# Patient Record
Sex: MALE | Race: WHITE | Employment: OTHER | URBAN - METROPOLITAN AREA
[De-identification: names, ages, dates, MRNs, and addresses within clinical notes are randomized per-mention and may not be internally consistent; named-entity substitution may affect disease eponyms.]

---

## 2022-01-05 DIAGNOSIS — G47.00 INSOMNIA, UNSPECIFIED TYPE: Primary | ICD-10-CM

## 2022-01-05 RX ORDER — CLONAZEPAM 1 MG/1
1 TABLET ORAL NIGHTLY PRN
COMMUNITY
End: 2022-01-05 | Stop reason: SDUPTHER

## 2022-01-05 RX ORDER — CLONAZEPAM 1 MG/1
1 TABLET ORAL NIGHTLY PRN
Qty: 60 TABLET | Refills: 2 | Status: SHIPPED | OUTPATIENT
Start: 2022-01-05 | End: 2022-10-17

## 2022-03-22 ENCOUNTER — OFFICE VISIT (OUTPATIENT)
Dept: PULMONOLOGY | Age: 77
End: 2022-03-22
Payer: MEDICARE

## 2022-03-22 VITALS
DIASTOLIC BLOOD PRESSURE: 71 MMHG | HEART RATE: 75 BPM | BODY MASS INDEX: 36.45 KG/M2 | HEIGHT: 78 IN | WEIGHT: 315 LBS | SYSTOLIC BLOOD PRESSURE: 141 MMHG | OXYGEN SATURATION: 97 % | RESPIRATION RATE: 16 BRPM | TEMPERATURE: 96.3 F

## 2022-03-22 DIAGNOSIS — R06.02 SOB (SHORTNESS OF BREATH): ICD-10-CM

## 2022-03-22 DIAGNOSIS — G47.00 INSOMNIA, UNSPECIFIED TYPE: ICD-10-CM

## 2022-03-22 DIAGNOSIS — J44.9 CHRONIC OBSTRUCTIVE PULMONARY DISEASE, UNSPECIFIED COPD TYPE (HCC): ICD-10-CM

## 2022-03-22 DIAGNOSIS — G47.61 PLMD (PERIODIC LIMB MOVEMENT DISORDER): ICD-10-CM

## 2022-03-22 DIAGNOSIS — G47.33 OSA (OBSTRUCTIVE SLEEP APNEA): Primary | ICD-10-CM

## 2022-03-22 DIAGNOSIS — G47.10 HYPERSOMNOLENCE: ICD-10-CM

## 2022-03-22 PROCEDURE — 99214 OFFICE O/P EST MOD 30 MIN: CPT | Performed by: INTERNAL MEDICINE

## 2022-03-22 RX ORDER — FUROSEMIDE 40 MG/1
TABLET ORAL
COMMUNITY
Start: 2021-10-21

## 2022-03-22 RX ORDER — POTASSIUM CHLORIDE 750 MG/1
10 CAPSULE, EXTENDED RELEASE ORAL 2 TIMES DAILY
COMMUNITY

## 2022-03-22 RX ORDER — APIXABAN 5 MG/1
5 TABLET, FILM COATED ORAL 2 TIMES DAILY
COMMUNITY
Start: 2022-02-13

## 2022-03-22 RX ORDER — EZETIMIBE 10 MG/1
10 TABLET ORAL DAILY
COMMUNITY
Start: 2021-03-31

## 2022-03-22 RX ORDER — EZETIMIBE 10 MG/1
TABLET ORAL
COMMUNITY
Start: 2022-01-16 | End: 2022-03-22

## 2022-03-22 RX ORDER — TIZANIDINE 4 MG/1
TABLET ORAL
COMMUNITY
Start: 2022-03-15

## 2022-03-22 ASSESSMENT — ENCOUNTER SYMPTOMS
RESPIRATORY NEGATIVE: 1
GASTROINTESTINAL NEGATIVE: 1
EYES NEGATIVE: 1
ALLERGIC/IMMUNOLOGIC NEGATIVE: 1

## 2022-03-22 NOTE — LETTER
3/22/22        Dandre Lara      I have seen this patient in the office today and wanted to communicate my findings and recommendations. Patient Instructions     ASSESSMENT/PLAN:   Diagnosis Orders   1. MARYAM (obstructive sleep apnea)     2. Chronic obstructive pulmonary disease, unspecified COPD type ([address]     3. Insomnia, unspecified type     4. Hypersomnolence     5. SOB (shortness of breath)     6. PLMD (periodic limb movement disorder)         cxr 1/2018  Mild bibasilar atelectasis without definite acute airspace consolidation  or pleural effusion. PFT done 9/2017  Spirometry shows moderate obstructive defect. There is significant response to bronchodilator demonstrated. The flow volume loop is compatible with obstructive lung defect. There is air trapping present. DLCO is moderately reduced but corrects partially when adjusted to [facility].    2/4/20  LDCT done at [address] [place] [address]:    1. There are several stable nodules identified within the left lung measuring 5 mm and less in size. 2. Coronary artery calcification. 3. Stable rounded atelectasis and scarring within the right lower lobe. ASSESSMENT: Lung RADS Category 2 - Benign appearance or behavior    MODIFIER: None    RECOMMENDATION: Follow Up CT Screening Lung (Low-Dose CT - LDCT) in 1 Year      Low-dose CT scan done [facility]/14/2021 at [facility]  IMPRESSION:   Stable scattered small pulmonary nodules measuring up to 5 mm.      ASSESSMENT: Lung RADS Category 2 - Benign appearance or behavior     MODIFIER: None     RECOMMENDATION: Follow Up CT Screening Lung (Low-Dose CT - LDCT) in 1 Year        SOB  Tried and failed with Dulera (caused tachycardia),   Tried incruse and didn't feel like it worked  sprivia worked well but not worth the cost  anoro worked the best but too expensive  incruse really didnh't help  stiolto and anoro tried and did not work    Continue with   Albuterol as needed    Will start on  sprivia respimat      Call me after using for 2 weeks      Weight was 350 then at 347 and then 338 and then at 335 lbs and then at 359 lbs and now at 347  Keep losing weight  Would consider consultation with Gastric/weight loss center    Blood pressure is controlled       autopap to min of 11 and max of 15  cflex of 3  Has 60131 + hrs  Doing well with this    Using 100% of time  Using 7.5 h/night  No leak  90% pressure is 12.6  No sleep apnea,ahi is 2.5  pb is 14%    Has older machine not capable of internet access  Seems not to be recording correctly and pressure may not be holding    The PAP therapy is not working properly. It is too old to repair or replace the parts. The patient will need new PAP therapy. The patient has been feeling benefit from PAP therapy and will need to continue. Cpap/Bipap is too old to fix and is not functioning properly  Patient has benefited from PAP use but now needs a new one  It is beyond repair and will need new replacement  Patient can not do without pap therapy       dme is [facility]    Using the cpap effectively  No issues  Sleeping well  No sleepiness in the daytime  ESS is 4/24    Cleared to drive as per [employer] rules, without restrictions    No insomnia  no cramps in the morning  Some plms    Will continue with :  Klonopin 1 mg at night, as needed  Will refill  Can use 1/2 tablet of the Klonopin    Can use :   Albuterol (proair-red inhaler) as needed 1-2 puffs, use sparingly at the most using this once a week    Exercise every other day    RTC in 6 months                     Thank you for allowing me to assist in the care of the Dwayne Elliott MD

## 2022-03-22 NOTE — PROGRESS NOTES
Joana Chopra (: 1945 ) is a 78 y.o. male here for an evaluation of   Chief Complaint   Patient presents with    Follow-up     sleep    Sleep Apnea         ASSESSMENT/PLAN:   Diagnosis Orders   1. MARYAM (obstructive sleep apnea)     2. Chronic obstructive pulmonary disease, unspecified COPD type ([address]     3. Insomnia, unspecified type     4. Hypersomnolence     5. SOB (shortness of breath)     6. PLMD (periodic limb movement disorder)         cxr 2018  Mild bibasilar atelectasis without definite acute airspace consolidation  or pleural effusion. PFT done 2017  Spirometry shows moderate obstructive defect. There is significant response to bronchodilator demonstrated. The flow volume loop is compatible with obstructive lung defect. There is air trapping present. DLCO is moderately reduced but corrects partially when adjusted to [place].    20  LDCT done at [address] [place] [address]:    1. There are several stable nodules identified within the left lung measuring 5 mm and less in size. 2. Coronary artery calcification. 3. Stable rounded atelectasis and scarring within the right lower lobe. ASSESSMENT: Lung RADS Category 2 - Benign appearance or behavior    MODIFIER: None    RECOMMENDATION: Follow Up CT Screening Lung (Low-Dose CT - LDCT) in 1 Year      Low-dose CT scan done 2021 at the [facility]  IMPRESSION:   Stable scattered small pulmonary nodules measuring up to 5 mm.      ASSESSMENT: Lung RADS Category 2 - Benign appearance or behavior     MODIFIER: None     RECOMMENDATION: Follow Up CT Screening Lung (Low-Dose CT - LDCT) in 1 Year        SOB  Tried and failed with Dulera (caused tachycardia),   Tried incruse and didn't feel like it worked  sprivia worked well but not worth the cost  anoro worked the best but too expensive  incruse really didnh't help  stiolto and anoro tried and did not work    Continue with   Albuterol as needed    Will start on  sprivia respimat      Call me after using for 2 weeks      Weight was 350 then at 347 and then 338 and then at 335 lbs and then at 359 lbs and now at 347  Keep losing weight  Would consider consultation with Gastric/weight loss center    Blood pressure is controlled       autopap to min of 11 and max of 15  cflex of 3  Has 24619 + hrs  Doing well with this    Using 100% of time  Using 7.5 h/night  No leak  90% pressure is 12.6  No sleep apnea,ahi is 2.5  pb is 14%    Has older machine not capable of internet access  Seems not to be recording correctly and pressure may not be holding    The PAP therapy is not working properly. It is too old to repair or replace the parts. The patient will need new PAP therapy. The patient has been feeling benefit from PAP therapy and will need to continue. Cpap/Bipap is too old to fix and is not functioning properly  Patient has benefited from PAP use but now needs a new one  It is beyond repair and will need new replacement  Patient can not do without pap therapy       dme is [facility]    Using the cpap effectively  No issues  Sleeping well  No sleepiness in the daytime  ESS is 4/24    Cleared to drive as per [employer] rules, without restrictions    No insomnia  no cramps in the morning  Some plms    Will continue with :  Klonopin 1 mg at night, as needed  Will refill  Can use 1/2 tablet of the Klonopin    Can use : Albuterol (proair-red inhaler) as needed 1-2 puffs, use sparingly at the most using this once a week    Exercise every other day    RTC in 6 months              No follow-ups on file.               [address] [facility]  [address] [facility] [address]  Dept: [phone]  Loc: [phone]     Diagnosis:  [x] MARYAM (ICD-10: G47.33)  o CSA (ICD-10: G47.31)  [] Complex Sleep Apnea (ICD-10: G47.37)  []  (ICD-10: G47.37)  [] Hypoxemia (ICD-10: R09.02)  [] COPD (J44.90)  [] Chronic Respiratory Failure with hypoxemia (J96.11)  [] Chronicr Respiratory Failure with hypercapnia (J96.12)  [] Restrictive Lung Disease (J98.4)      [x] New Rx (Device Preference: ______autoresmed___________________)     [] Change Order       [] Replace ___________  [] Clinical assessments and may include IN-Check device, spirometry and ETCO2 PRN    [] Discontinue Order -  [] CPAP    [] BPAP    [] PAP    [] Oxygen   /   AMA?  [] Yes   [] No              Therapy    AHI: ________ YINA: ________  LOW SpO2: ________%      DME:[facility]    DEVICE / SETTINGS RAMP / COMFORT INTERFACE   []  CPAP () Pressure    Ramp: _________ min's  [] Ramp to patient preference General PAP Supply Kit  Medicaid does not cover heated tubing  ([employer])  PAP Tubing:  [x] Heated ()- 1/3 mos                         [x] Regular ()  1/3 mos  [x] Disposable Water Chamber () - 1/6 mos  [x] Disposable Filter () - 2/mo  [x] Non-Disposable Filter () - 1/6 mos   []  BiLevel PAP ()           IPAP        []  BiLevel PAP with   ()       Backup Rate ()      EPAP Rate  [] Adjust FLEX to patient comfort       SUPPLEMENTAL OXYGEN  [] OXYGEN:      Liter/min: _________  [] Continuous        [] Nocturnal  [] Bleed into PAP Device      [x]  Altair Prep 11                  Max Press 15  Mask Interface Kit    [] Mask interface () - 1/3 mos  [] Nasal Cushion ()  2/mo  [] Nasal Pillows ()  -2/mo  [] Headgear ()  1/6 mos   []  AutoBiLevel () Pressure  ()      Support           []  ResMed® IVAPS EPAP  [] Overnight Oximetry on Room Air  [] Overnight Oximetry on PAP Therapy    Target Pt Rate  Min PS      Target Va  Patient Ht  Ti Max                Ti Min        Rise time  Max PS  Trigger  Cycle  Epap  Epap max  Epap min  The patient has a history of:  [] Excessive Daytime Sleepiness  [] Insomnia  [] Impaired Cognition  [] Ischemic Heart Disease  [] Hypertension  [] Mood Disorders          [] History of properly fit, is the most comfortable to the patient and will be used with the BPAP device. [] The prescribed mask interface has been properly fit, is the most comfortable to the patient and will be used with the RAD.   o Current CPAP setting prevents patient from tolerating the therapy and lower CPAP settings fail to adequately control the symptoms of MARYAM, improve sleep quality, or reduce AHI to acceptable levels. [] Current CPAP setting prevent patient from tolerating the therapy and lower PAP settings fail to  adequately control MARYAM symptoms, improve sleep quality, or reduce AHI to acceptable levels.          [] There is significant improvement of the respiratory events on the RAD                                                                                                                                                                                                                                  Chasidy Miller MD               NPI- [id number]     Heydi Angel [phone]                    03/22/22       ____________________________                        _______________________           Physician Signature                                                         Date                                                 SUBJECTIVE/OBJECTIVE:    Transfer of care from trial to [address] [place] [address]  Initially seen at [address] [facility] pulmonary and sleep on 3/22/2022    Last seen at [facility] on 2/11/2021    Consult from Dr. Velazquez Number  Initially done 3/16/18  For copd    Dx recently with copd by PFT , done in 9/2017    Sob for year +  Getting worse    Was tried on inhaler hwoever then had issues with heart,  Hr was 164    Sob will affect the walking     No noct symptoms    Shx:  Quit 3/2012, smoking  20 +pky  H/o etoh abuse but occasional etoh    Fhx:  Heart disease, father with 2 MI  GF had lung cancer    noheadache  No chest pain  No ear popping     No bloating  No burping     Last couple of weeks, will wake up at 230 and then wake up 430        Going ot bed at 10 pm and waking up at 5-30 am     Still using klonopin and using as needed  No SE       Tried incruse and spirivia  sprivia worked well    proair seems to help    However using this about every other day    Having issues with breathing issues    incruse not really working    Now on the klonopin at 0.5 mg from the 1 mg  And would like to coming    Legs not bothering him at night    proair maybe once a day    Breathing   Seeing Dr. Marlee Fenton  Cardiologist  And had testing done  EF is 64%    Right now off all medications for the lungs    Albuterol taking as needed, about 1-2 times a week    Padilla with carrying with sob    No chest pain    breathign  PADILLA with exertion and carrying  Albuterol as needed    Now on weight watchers    No headache  No bloating  No burping  No ear popping  No snoring    No dry mouth, occ    Going to bed at 10 pm and waking up 6am        Pt's machine was recalled but nothing yet, registered at [employer] but that was nine years. Having some dry mouth    Now with some sob, no chest pain  Using more albuterol              Review of Systems   Constitutional: Negative. HENT: Negative. Eyes: Negative. Respiratory: Negative. Cardiovascular: Negative. Gastrointestinal: Negative. Endocrine: Negative. Genitourinary: Negative. Musculoskeletal: Negative. Skin: Negative. Allergic/Immunologic: Negative. Neurological: Negative. Hematological: Negative. Psychiatric/Behavioral: Negative. Vitals:    03/22/22 0952   BP: (!) 141/71   Pulse: 75   Resp: 16   Temp: 96.3 °F (35.7 °C)   TempSrc: Temporal   SpO2: 97%   Weight: (!) 347 lb (157.4 kg)   Height: 6' 8\" (2.032 m)        Physical Exam  Vitals and nursing note reviewed. Constitutional:       General: He is not in acute distress. Appearance: Normal appearance. He is not ill-appearing. HENT:      Head: Normocephalic and atraumatic.       Right Ear: External ear normal.      Left Ear: External ear normal.      Nose: Nose normal.      Mouth/Throat:      Mouth: Mucous membranes are moist.      Pharynx: Oropharynx is clear. Comments: Mallampati 3  Eyes:      General: No scleral icterus. Extraocular Movements: Extraocular movements intact. Conjunctiva/sclera: Conjunctivae normal.      Pupils: Pupils are equal, round, and reactive to light. Cardiovascular:      Rate and Rhythm: Normal rate and regular rhythm. Pulses: Normal pulses. Heart sounds: Normal heart sounds. No murmur heard. No friction rub. Pulmonary:      Effort: No respiratory distress. Breath sounds: No stridor. No wheezing, rhonchi or rales. Chest:      Chest wall: No tenderness. Abdominal:      General: Abdomen is flat. Bowel sounds are normal. There is no distension. Tenderness: There is no abdominal tenderness. There is no guarding. Musculoskeletal:         General: No swelling or tenderness. Normal range of motion. Cervical back: Normal range of motion and neck supple. No rigidity. Skin:     General: Skin is warm and dry. Coloration: Skin is not jaundiced. Neurological:      General: No focal deficit present. Mental Status: He is alert and oriented to person, place, and time. Mental status is at baseline. Cranial Nerves: No cranial nerve deficit. Sensory: No sensory deficit. Motor: No weakness. Gait: Gait normal.   Psychiatric:         Mood and Affect: Mood normal.         Thought Content:  Thought content normal.         Judgment: Judgment normal.              Ngoc Holbrook MD

## 2022-03-22 NOTE — PROGRESS NOTES
MA Communication: The following orders are received by verbal communication from Seth Vyas MD    Orders include:     Follow up: 10/17/2022 9:00 am Otto VARELA order faxed

## 2022-03-22 NOTE — PATIENT INSTRUCTIONS
ASSESSMENT/PLAN:   Diagnosis Orders   1. MARYAM (obstructive sleep apnea)     2. Chronic obstructive pulmonary disease, unspecified COPD type ([facility]     3. Insomnia, unspecified type     4. Hypersomnolence     5. SOB (shortness of breath)     6. PLMD (periodic limb movement disorder)         cxr 1/2018  Mild bibasilar atelectasis without definite acute airspace consolidation  or pleural effusion. PFT done 9/2017  Spirometry shows moderate obstructive defect. There is significant response to bronchodilator demonstrated. The flow volume loop is compatible with obstructive lung defect. There is air trapping present. DLCO is moderately reduced but corrects partially when adjusted to [place].    2/4/20  LDCT done at [address] [place] [address]:    1. There are several stable nodules identified within the left lung measuring 5 mm and less in size. 2. Coronary artery calcification. 3. Stable rounded atelectasis and scarring within the right lower lobe. ASSESSMENT: Lung RADS Category 2 - Benign appearance or behavior    MODIFIER: None    RECOMMENDATION: Follow Up CT Screening Lung (Low-Dose CT - LDCT) in 1 Year      Low-dose CT scan done 11/14/2021 at the [facility]  IMPRESSION:   Stable scattered small pulmonary nodules measuring up to 5 mm.      ASSESSMENT: Lung RADS Category 2 - Benign appearance or behavior     MODIFIER: None     RECOMMENDATION: Follow Up CT Screening Lung (Low-Dose CT - LDCT) in 1 Year        SOB  Tried and failed with Dulera (caused tachycardia),   Tried incruse and didn't feel like it worked  sprivia worked well but not worth the cost  anoro worked the best but too expensive  incruse really didnh't help  stiolto and anoro tried and did not work    Continue with   Albuterol as needed    Will start on  sprivia respimat      Call me after using for 2 weeks      Weight was 350 then at 347 and then 338 and then at 335 lbs and then at 359 lbs and now at 347  Keep losing weight  Would consider consultation with Gastric/weight loss center    Blood pressure is controlled       autopap to min of 11 and max of 15  cflex of 3  Has 85361 + hrs  Doing well with this    Using 100% of time  Using 7.5 h/night  No leak  90% pressure is 12.6  No sleep apnea,ahi is 2.5  pb is 14%    Has older machine not capable of internet access  Seems not to be recording correctly and pressure may not be holding    The PAP therapy is not working properly. It is too old to repair or replace the parts. The patient will need new PAP therapy. The patient has been feeling benefit from PAP therapy and will need to continue. Cpap/Bipap is too old to fix and is not functioning properly  Patient has benefited from PAP use but now needs a new one  It is beyond repair and will need new replacement  Patient can not do without pap therapy       dme is [facility]    Using the cpap effectively  No issues  Sleeping well  No sleepiness in the daytime  ESS is 4/24    Cleared to drive as per [employer] rules, without restrictions    No insomnia  no cramps in the morning  Some plms    Will continue with :  Klonopin 1 mg at night, as needed  Will refill  Can use 1/2 tablet of the Klonopin    Can use :   Albuterol (proair-red inhaler) as needed 1-2 puffs, use sparingly at the most using this once a week    Exercise every other day    RTC in 6 months

## 2022-04-08 ENCOUNTER — TELEPHONE (OUTPATIENT)
Dept: PULMONOLOGY | Age: 77
End: 2022-04-08

## 2022-10-17 ENCOUNTER — OFFICE VISIT (OUTPATIENT)
Dept: PULMONOLOGY | Age: 77
End: 2022-10-17
Payer: MEDICARE

## 2022-10-17 VITALS
WEIGHT: 315 LBS | DIASTOLIC BLOOD PRESSURE: 72 MMHG | BODY MASS INDEX: 36.45 KG/M2 | RESPIRATION RATE: 16 BRPM | HEART RATE: 75 BPM | HEIGHT: 78 IN | SYSTOLIC BLOOD PRESSURE: 128 MMHG | OXYGEN SATURATION: 94 % | TEMPERATURE: 97.2 F

## 2022-10-17 DIAGNOSIS — Z87.891 PERSONAL HISTORY OF TOBACCO USE: ICD-10-CM

## 2022-10-17 DIAGNOSIS — G47.61 PLMD (PERIODIC LIMB MOVEMENT DISORDER): ICD-10-CM

## 2022-10-17 DIAGNOSIS — G47.10 HYPERSOMNOLENCE: ICD-10-CM

## 2022-10-17 DIAGNOSIS — G47.00 INSOMNIA, UNSPECIFIED TYPE: ICD-10-CM

## 2022-10-17 DIAGNOSIS — R06.02 SOB (SHORTNESS OF BREATH): ICD-10-CM

## 2022-10-17 DIAGNOSIS — G47.33 OSA (OBSTRUCTIVE SLEEP APNEA): ICD-10-CM

## 2022-10-17 DIAGNOSIS — J44.9 CHRONIC OBSTRUCTIVE PULMONARY DISEASE, UNSPECIFIED COPD TYPE (HCC): Primary | ICD-10-CM

## 2022-10-17 PROCEDURE — G0296 VISIT TO DETERM LDCT ELIG: HCPCS | Performed by: INTERNAL MEDICINE

## 2022-10-17 PROCEDURE — 1123F ACP DISCUSS/DSCN MKR DOCD: CPT | Performed by: INTERNAL MEDICINE

## 2022-10-17 PROCEDURE — 99214 OFFICE O/P EST MOD 30 MIN: CPT | Performed by: INTERNAL MEDICINE

## 2022-10-17 RX ORDER — ASCORBIC ACID 500 MG
500 TABLET ORAL DAILY
COMMUNITY

## 2022-10-17 RX ORDER — GABAPENTIN 300 MG/1
300 CAPSULE ORAL DAILY
COMMUNITY

## 2022-10-17 RX ORDER — ATORVASTATIN CALCIUM 80 MG/1
80 TABLET, FILM COATED ORAL DAILY
COMMUNITY

## 2022-10-17 RX ORDER — CILOSTAZOL 100 MG/1
100 TABLET ORAL 2 TIMES DAILY
COMMUNITY
Start: 2022-09-06

## 2022-10-17 ASSESSMENT — ENCOUNTER SYMPTOMS
GASTROINTESTINAL NEGATIVE: 1
EYES NEGATIVE: 1
ALLERGIC/IMMUNOLOGIC NEGATIVE: 1
RESPIRATORY NEGATIVE: 1

## 2022-10-17 NOTE — PROGRESS NOTES
MA Communication:   The following orders are received by verbal communication from Nicholas Raza MD    Orders include:  CT Lung Screen in 6 months (pt to schedule)       Order sent to [facility] via [facility]       6 mo fu scheduled 4/10/23

## 2022-10-17 NOTE — PROGRESS NOTES
Autumn Kwon (: 1945 ) is a 78 y.o. male here for an evaluation of   Chief Complaint   Patient presents with    Sleep Apnea         ASSESSMENT/PLAN:   Diagnosis Orders   1. Chronic obstructive pulmonary disease, unspecified COPD type ([address]        2. MARYAM (obstructive sleep apnea)        3. Insomnia, unspecified type        4. Hypersomnolence        5. PLMD (periodic limb movement disorder)        6. SOB (shortness of breath)        7. Personal history of tobacco use  AK VISIT TO DISCUSS LUNG CA SCREEN W LDCT    CT Lung Screen (Annual)          cxr 2018  Mild bibasilar atelectasis without definite acute airspace consolidation  or pleural effusion. PFT done 2017  Spirometry shows moderate obstructive defect. There is significant response to bronchodilator demonstrated. The flow volume loop is compatible with obstructive lung defect. There is air trapping present. DLCO is moderately reduced but corrects partially when adjusted to [place].    20  LDCT done at [address] [place] [address]:    1. There are several stable nodules identified within the left lung measuring 5 mm and less in size. 2. Coronary artery calcification. 3. Stable rounded atelectasis and scarring within the right lower lobe. ASSESSMENT: Lung RADS Category 2 - Benign appearance or behavior    MODIFIER: None    RECOMMENDATION: Follow Up CT Screening Lung (Low-Dose CT - LDCT) in 1 Year      Low-dose CT scan done 2021 at the [facility]  IMPRESSION:   Stable scattered small pulmonary nodules measuring up to 5 mm.      ASSESSMENT: Lung RADS Category 2 - Benign appearance or behavior     MODIFIER: None     RECOMMENDATION: Follow Up CT Screening Lung (Low-Dose CT - LDCT) in 1 Year      Tobacco use , history  Quit smoking  March 3, 2012  30 pky    Will get LDCT , call me results            SOB/COPD  Tried and failed with Dulera (caused tachycardia),   Tried incruse and didn't feel like it worked  sprivia worked well but not worth the cost  anoro worked the best but too expensive  incruse really didnh't help  stiolto and anoro tried and did not work    Continue with   Albuterol (proair-red inhaler) as needed 1-2 puffs, use sparingly at the most using this once a week          Weight was 350 then at 347 and then 338 and then at 335 lbs and then at 359 lbs and then at 347 and now at 358  Keep losing weight  Would consider consultation with Gastric/weight loss center    Blood pressure is controlled       autopap to min of 11 and max of 15  New  7/26/22    Epr is 3  Standard response    Using 100% of time  Using 8 h/night  No leak, 11.5  90% pressure is 13.9  No sleep apnea,ahi is 2.1    I have access via [employer]  Feeling the benefit of cpap therapy        dme is [facility]    Using the cpap effectively  No issues  Sleeping well  No sleepiness in the daytime  ESS is 4/24    Cleared to drive as per [place] rules, without restrictions      Insomnia/PLMS    Will continue with :  Klonopin 1 mg at night, as needed, I have filled this in the past  Now seeing Pain Physician  Right now on gabapentin   I can refill klonopin or Dr. Leslye Campos      Exercise every other day    RTC in 6 months              No follow-ups on file. I have personally reviewed and summarized the old records a        I have independently reviewed the images and reviewed with patient    I have reviewed the lab tests, radiology reports and medications    I have downloaded and interpreted the cpap/bipap/pap data. I have made adjustments as described    Reviewed present meds and side effects. Continue present meds. Stay compliant. Call if worsens. Reviewed proper inhaler usage           [address] [facility], SLEEP & [address] [facility],[address] Floor [facility] [address]  [address] [facility] [place] [address]  Dept: [phone]  Loc: [phone]     Diagnosis:  [x] MARYAM (ICD-10: G47.33)  o CSA (ICD-10: G47.31)  [] Complex Sleep Apnea (ICD-10: G47. 40)  []  (ICD-10: G47.37)  [] Hypoxemia (ICD-10: R09.02)  [] COPD (J44.90)  [] Chronic Respiratory Failure with hypoxemia (J96.11)  [] Chronicr Respiratory Failure with hypercapnia (J96.12)  [] Restrictive Lung Disease (J98.4)      [] New Rx (Device Preference: _________________________)     [] Change Order       [] Replace ___________  [] Clinical assessments and may include IN-Check device, spirometry and ETCO2 PRN    [] Discontinue Order -  [] CPAP    [] BPAP    [] PAP    [] Oxygen   /   AMA?  [] Yes   [] No              Therapy    AHI: ________ YINA: ________  LOW SpO2: ________%      DME:[facility]    DEVICE / SETTINGS RAMP / COMFORT INTERFACE   []  CPAP () Pressure    Ramp: _________ min's  [] Ramp to patient preference General PAP Supply Kit  Medicaid does not cover heated tubing  ([employer])  PAP Tubing:  [x] Heated ()- 1/3 mos                         [x] Regular () - 1/3 mos  [x] Disposable Water Chamber () - 1/6 mos  [x] Disposable Filter () - 2/mo  [x] Non-Disposable Filter () - 1/6 mos   []  BiLevel PAP ()           IPAP        []  BiLevel PAP with   ()       Backup Rate ()      EPAP Rate  [] Adjust FLEX to patient comfort       SUPPLEMENTAL OXYGEN  [] OXYGEN:      Liter/min: _________  [] Continuous        [] Nocturnal  [] Bleed into PAP Device      []  [employer]                   Max Press   Mask Interface Kit    [] Mask interface () - 1/3 mos  [] Nasal Cushion () - 2/mo  [] Nasal Pillows ()  -2/mo  [] Headgear () - 1/6 mos   []  AutoBiLevel () Pressure  ()      Support           []  ResMed® IVAPS EPAP  [] Overnight Oximetry on Room Air  [] Overnight Oximetry on PAP Therapy  [] Overnight Oximetry on ___ LPM of oxygen  []  Overnight Oximetry on  PAP therapy with _____ lpm of O2    Target Pt Rate  Min PS      Target Va  Patient Ht  Ti Max                Ti Min        Rise time  Max PS  Trigger  Cycle  Epap  Epap max  Epap min  The patient has a history of:  [] Excessive Daytime Sleepiness  [] Insomnia  [] Impaired Cognition  [] Ischemic Heart Disease  [] Hypertension  [] Mood Disorders          [] History of Stroke  Additional Orders:______________________________________________________________________________________________________________    [] Titrate to comfort  [] Add cloud access via [employer] Full Face Mask Kit    [x] Full face mask () - 1/3 mos  [x] Full Face Cushion () - 1/mo  [x] Headgear () - 1/6 mos                                           [] Respironics® ASV Advanced ()     EPAP Min  PS Min      EPAP Max  PS Max   Additional Supplies    [] Chin Strap ()  [] Heated Humidifier Kit ()  Mask Specifications:   [] Patient Preference      -or-            Brand:______________ Size:_______________   Type: __________________________________   [] Mask Refit:___________________________  [] Mask Fitting:  [] Full     [] Nasal                []  Pillows                                Max Press  Ramp Time      Rate  Bi-flex: []1  []2  []3     [] Respironics® AVAPS: ()     IPAP Min  IPAP Max  Pressure Max  Epap max  Epap min  Rise time                      Avaps rate  EPAP   Additional Services    [] Annual PRN service and check of equipment  [] Routine service and check of equipment  [] Download and report compliance data   Tidal volume      Tigger                                     Rate                                         Inspiratory time                                                         The following equipment is Medically Necessary for the above stated patient. It is reasonable and necessary in reference to acceptable standards of medical practice for this condition, and is not prescribed as a convenience.            Frequency of Use:    Daily                 Length of Need: 13 Months              o The patient requires BiLevel PAP and the following apply: []  The patient requires a Respiratory Assist Device (RAD) and the following apply:   o CPAP was tried and failed to meet therapeutic goals. [] CPAP was tried, but failed to meet therapeutic goals   o The prescribed mask interface has been properly fit, is the most comfortable to the patient and will be used with the BPAP device. [] The prescribed mask interface has been properly fit, is the most comfortable to the patient and will be used with the RAD.   o Current CPAP setting prevents patient from tolerating the therapy and lower CPAP settings fail to adequately control the symptoms of MARYAM, improve sleep quality, or reduce AHI to acceptable levels. [] Current CPAP setting prevent patient from tolerating the therapy and lower PAP settings fail to  adequately control MARYAM symptoms, improve sleep quality, or reduce AHI to acceptable levels.          [] There is significant improvement of the respiratory events on the RAD                                                                                                                                                                                                                                  Sunil Chaparro MD               NPI- [id number]     Cong Montes [id number]                    10/17/22       ____________________________                        _______________________           Physician Signature                                                         Date                                                                                              SUBJECTIVE/OBJECTIVE:    Transfer of care from [facility] to [facility]  Initially seen at [facility] pulmonary and sleep on 3/22/2022    Last seen at [address] [place] [address] on 2/11/2021    Consult from Dr. Kaity Hoskins  Initially done 3/16/18  For copd    Dx recently with copd by PFT , done in 9/2017    Sob for year +  Getting worse    Was tried on inhaler hwoever then had issues with heart,  Hr was 164    Sob will affect the walking     No noct symptoms    Shx:  Quit 3/2012, smoking  20 +pky  H/o etoh abuse but occasional etoh    Fhx:  Heart disease, father with 2 MI  GF had lung cancer    noheadache  No chest pain  No ear popping     No bloating  No burping     Last couple of weeks, will wake up at 230 and then wake up 430        Going ot bed at 10 pm and waking up at 5-30 am     Still using klonopin and using as needed  No SE       Tried incruse and spirivia  sprivia worked well    proair seems to help    However using this about every other day    Having issues with breathing issues    incruse not really working    Now on the klonopin at 0.5 mg from the 1 mg  And would like to coming    Legs not bothering him at night    proair maybe once a day    Breathing   Seeing Dr. Kylee Funes  Cardiologist  And had testing done  EF is 64%    Right now off all medications for the lungs    Albuterol taking as needed, about 1-2 times a week    Padilla with carrying with sob    No chest pain    breathign  PADILLA with exertion and carrying  Albuterol as needed    Now on weight watchers    No headache  No bloating  No burping  No ear popping  No snoring    No dry mouth, occ    Going to bed at 10 pm and waking up 6am        Pt's machine was recalled but nothing yet, registered at [employer] but that was nine years. Having some dry mouth    Now with some sob, no chest pain  Using more albuterol      Since list visit      Since getting the new machine  No snoring  No bloating  No burping  No ear popping  No dry mouth  No chest pains    Tolerating pressures well  No RLS  No PLM's    Going to bed at 10 and waking up at 6      Now also having issues with vascular issues  And also with knee problems      Using albuterol once a week  And not really feeling any problems    Quit smoking  March 3, 2012  30 pky        Review of Systems   Constitutional: Negative. HENT: Negative. Eyes: Negative. Respiratory: Negative. Cardiovascular: Negative.     Gastrointestinal: Negative. Endocrine: Negative. Genitourinary: Negative. Musculoskeletal: Negative. Skin: Negative. Allergic/Immunologic: Negative. Neurological: Negative. Hematological: Negative. Psychiatric/Behavioral: Negative. Vitals:    10/17/22 0837   BP: 128/72   Site: Left Upper Arm   Position: Sitting   Cuff Size: Large Adult   Pulse: 75   Resp: 16   Temp: 97.2 °F (36.2 °C)   TempSrc: Temporal   SpO2: 94%   Weight: (!) 358 lb (162.4 kg)   Height: 6' 8\" (2.032 m)        Physical Exam  Vitals and nursing note reviewed. Constitutional:       General: He is not in acute distress. Appearance: Normal appearance. He is not ill-appearing. HENT:      Head: Normocephalic and atraumatic. Right Ear: External ear normal.      Left Ear: External ear normal.      Nose: Nose normal.      Mouth/Throat:      Mouth: Mucous membranes are moist.      Pharynx: Oropharynx is clear. Comments: Mallampati 3  Eyes:      General: No scleral icterus. Extraocular Movements: Extraocular movements intact. Conjunctiva/sclera: Conjunctivae normal.      Pupils: Pupils are equal, round, and reactive to light. Cardiovascular:      Rate and Rhythm: Normal rate and regular rhythm. Pulses: Normal pulses. Heart sounds: Normal heart sounds. No murmur heard. No friction rub. Pulmonary:      Effort: No respiratory distress. Breath sounds: No stridor. No wheezing, rhonchi or rales. Chest:      Chest wall: No tenderness. Abdominal:      General: Abdomen is flat. Bowel sounds are normal. There is no distension. Tenderness: There is no abdominal tenderness. There is no guarding. Musculoskeletal:         General: No swelling or tenderness. Normal range of motion. Cervical back: Normal range of motion and neck supple. No rigidity. Skin:     General: Skin is warm and dry. Coloration: Skin is not jaundiced. Neurological:      General: No focal deficit present. Mental Status: He is alert and oriented to person, place, and time. Mental status is at baseline. Cranial Nerves: No cranial nerve deficit. Sensory: No sensory deficit. Motor: No weakness. Gait: Gait normal.   Psychiatric:         Mood and Affect: Mood normal.         Thought Content: Thought content normal.         Judgment: Judgment normal.            Karly Gaitan MD Low Dose CT (LDCT) Lung Screening criteria met:     Age 50-77(Medicare) or 50-80 ([facility])   Pack year smoking >20   Still smoking or less than 15 year since quit   No sign or symptoms of lung cancer   > 11 months since last LDCT     Risks and benefits of lung cancer screening with LDCT scans discussed:    Significance of positive screen - False-positive LDCT results often occur. 95% of all positive results do not lead to a diagnosis of cancer. Usually further imaging can resolve most false-positive results; however, some patients may require invasive procedures. Over diagnosis risk - 10% to 12% of screen-detected lung cancer cases are over diagnosed--that is, the cancer would not have been detected in the patient's lifetime without the screening. Need for follow up screens annually to continue lung cancer screening effectiveness     Risks associated with radiation from annual LDCT- Radiation exposure is about the same as for a mammogram, which is about 1/3 of the annual background radiation exposure from everyday life. Starting screening at age 54 is not likely to increase cancer risk from radiation exposure. Patients with comorbidities resulting in life expectancy of < 10 years, or that would preclude treatment of an abnormality identified on CT, should not be screened due to lack of benefit.     To obtain maximal benefit from this screening, smoking cessation and long-term abstinence from smoking is critical

## 2022-10-17 NOTE — PATIENT INSTRUCTIONS
ASSESSMENT/PLAN:   Diagnosis Orders   1. Chronic obstructive pulmonary disease, unspecified COPD type ([address]        2. MARYAM (obstructive sleep apnea)        3. Insomnia, unspecified type        4. Hypersomnolence        5. PLMD (periodic limb movement disorder)        6. SOB (shortness of breath)        7. Personal history of tobacco use  AR VISIT TO DISCUSS LUNG CA SCREEN W LDCT    CT Lung Screen (Annual)          cxr 1/2018  Mild bibasilar atelectasis without definite acute airspace consolidation  or pleural effusion. PFT done 9/2017  Spirometry shows moderate obstructive defect. There is significant response to bronchodilator demonstrated. The flow volume loop is compatible with obstructive lung defect. There is air trapping present. DLCO is moderately reduced but corrects partially when adjusted to [place].    2/4/20  LDCT done at [address] [place] [address]:    1. There are several stable nodules identified within the left lung measuring 5 mm and less in size. 2. Coronary artery calcification. 3. Stable rounded atelectasis and scarring within the right lower lobe. ASSESSMENT: Lung RADS Category 2 - Benign appearance or behavior    MODIFIER: None    RECOMMENDATION: Follow Up CT Screening Lung (Low-Dose CT - LDCT) in 1 Year      Low-dose CT scan done 11/14/2021 at the [facility]  IMPRESSION:   Stable scattered small pulmonary nodules measuring up to 5 mm.      ASSESSMENT: Lung RADS Category 2 - Benign appearance or behavior     MODIFIER: None     RECOMMENDATION: Follow Up CT Screening Lung (Low-Dose CT - LDCT) in 1 Year      Tobacco use , history  Quit smoking  March 3, 2012  30 pky    Will get LDCT , call me results            SOB/COPD  Tried and failed with Dulera (caused tachycardia),   Tried incruse and didn't feel like it worked  sprivia worked well but not worth the cost  anoro worked the best but too expensive  incruse really didnh't help  stiolto and anoro tried and did not work    Continue with Albuterol (proair-red inhaler) as needed 1-2 puffs, use sparingly at the most using this once a week          Weight was 350 then at 347 and then 338 and then at 335 lbs and then at 359 lbs and then at 347 and now at 358  Keep losing weight  Would consider consultation with Gastric/weight loss center    Blood pressure is controlled       autopap to min of 11 and max of 15  New  7/26/22    Epr is 3  Standard response    Using 100% of time  Using 8 h/night  No leak, 11.5  90% pressure is 13.9  No sleep apnea,ahi is 2.1    I have access via [employer]  Feeling the benefit of cpap therapy        dme is [facility]    Using the cpap effectively  No issues  Sleeping well  No sleepiness in the daytime  ESS is 4/24    Cleared to drive as per [place] rules, without restrictions      Insomnia/PLMS    Will continue with :  Klonopin 1 mg at night, as needed, I have filled this in the past  Now seeing Pain Physician  Right now on gabapentin   I can refill klonopin or Dr. Jennifer Wade      Exercise every other day    RTC in 6 months        What is lung cancer screening? Lung cancer screening is a way in which doctors check the lungs for early signs of cancer in people who have no symptoms of lung cancer. A low-dose CT scan uses much less radiation than a normal CT scan and shows a more detailed image of the lungs than a standard X-ray. The goal of lung cancer screening is to find cancer early, before it has a chance to grow, spread, or cause problems. One large study found that smokers who were screened with low-dose CT scans were less likely to die of lung cancer than those who were screened with standard X-ray. Below is a summary of the things you need to know regarding screening for lung cancer with low-dose computed tomography (LDCT). This is a screening program that involves routine annual screening with LDCT studies of the lung.   The LDCTs are done using low-dose radiation that is not thought to increase your cancer risk. If you have other serious medical conditions (other cancers, congestive heart failure) that limit your life expectancy to less than 10 years, you should not undergo lung cancer screening with LDCT. The chance is 20%-60% that the LDCT result will show abnormalities. This would require additional testing which could include repeat imaging or even invasive procedures. Most (about 95%) of \"abnormal\" LDCT results are false in the sense that no lung cancer is ultimately found. Additionally, some (about 10%) of the cancers found would not affect your life expectancy, even if undetected and untreated. If you are still smoking, the single most important thing that you can do to reduce your risk of dying of lung cancer is to quit. For this screening to be covered by Medicare and most other insurers, strict criteria must be met. If you do not meet these criteria, but still wish to undergo LDCT testing, you will be required to sign a waiver indicating your willingness to pay for the scan. Remember to bring a list of pulmonary medications and any CPAP or BiPAP machines to your next appointment with the office. Please keep all of your future appointments scheduled by [facility], [facility] Pulmonary office. Out of respect for other patients and providers, you may be asked to reschedule your appointment if you arrive later than your scheduled appointment time. Appointments cancelled less than 24hrs in advance will be considered a no show. Patients with three missed appointments within 1 year or four missed appointments within 2 years can be dismissed from the practice. Please be aware that our physicians are required to work in the Intensive Care Unit at [facility].  Your appointment may need to be rescheduled if they are designated to work during your appointment time. You may receive a survey regarding the care you received during your visit.   Your input is valuable to us. We encourage you to complete and return your survey. We hope you will choose us in the future for your healthcare needs. Pt instructed of all future appointment dates & times, including radiology, labs, procedures & referrals. If procedures were scheduled preparation instructions provided. Instructions on future appointments with [facility] were given.

## 2022-10-17 NOTE — LETTER
[facility] Pulmonology, Sleep & [address] [facility],[address] [facility] [address]  Phone: [phone]  Fax: [phone]    Maribel Lucas MD        October 17, 2022     Patient: Amanda Mcnamara   YOB: 1945   Date of Visit: 10/17/2022     10/17/22        Sabino Lara      I have seen this patient in the office today and wanted to communicate my findings and recommendations. Patient Instructions       ASSESSMENT/PLAN:   Diagnosis Orders   1. Chronic obstructive pulmonary disease, unspecified COPD type ([address]        2. MARYAM (obstructive sleep apnea)        3. Insomnia, unspecified type        4. Hypersomnolence        5. PLMD (periodic limb movement disorder)        6. SOB (shortness of breath)        7. Personal history of tobacco use  VT VISIT TO DISCUSS LUNG CA SCREEN W LDCT    CT Lung Screen (Annual)          cxr 1/2018  Mild bibasilar atelectasis without definite acute airspace consolidation  or pleural effusion. PFT done 9/2017  Spirometry shows moderate obstructive defect. There is significant response to bronchodilator demonstrated. The flow volume loop is compatible with obstructive lung defect. There is air trapping present. DLCO is moderately reduced but corrects partially when adjusted to [place].    2/4/20  LDCT done at [address] [place] [address]:    1. There are several stable nodules identified within the left lung measuring 5 mm and less in size. 2. Coronary artery calcification. 3. Stable rounded atelectasis and scarring within the right lower lobe. ASSESSMENT: Lung RADS Category 2 - Benign appearance or behavior    MODIFIER: None    RECOMMENDATION: Follow Up CT Screening Lung (Low-Dose CT - LDCT) in 1 Year      Low-dose CT scan done 11/14/2021 at the [facility]  IMPRESSION:   Stable scattered small pulmonary nodules measuring up to 5 mm.      ASSESSMENT: Lung RADS Category 2 - Benign appearance or behavior     MODIFIER: None     RECOMMENDATION: Follow Up CT Screening Lung (Low-Dose CT - LDCT) in 1 Year      Tobacco use , history  Quit smoking  March 3, 2012  30 pky    Will get LDCT , call me results            SOB/COPD  Tried and failed with Dulera (caused tachycardia),   Tried incruse and didn't feel like it worked  sprivia worked well but not worth the cost  anoro worked the best but too expensive  incruse really didnh't help  stiolto and anoro tried and did not work    Continue with   Albuterol (proair-red inhaler) as needed 1-2 puffs, use sparingly at the most using this once a week          Weight was 350 then at 347 and then 338 and then at 335 lbs and then at 359 lbs and then at 347 and now at 0  Keep losing weight  Would consider consultation with Gastric/weight loss center    Blood pressure is controlled       autopap to min of 11 and max of 15  New  7/26/22    Epr is 3  Standard response    Using 100% of time  Using 8 h/night  No leak, 11.5  90% pressure is 13.9  No sleep apnea,ahi is 2.1    I have access via [employer]  Feeling the benefit of cpap therapy        dme is [facility]    Using the cpap effectively  No issues  Sleeping well  No sleepiness in the daytime  ESS is 4/24    Cleared to drive as per [employer] rules, without restrictions      Insomnia/PLMS    Will continue with :  Klonopin 1 mg at night, as needed, I have filled this in the past  Now seeing Pain Physician  Right now on gabapentin   I can refill klonopin or Dr. Villalobos Puls      Exercise every other day    RTC in 6 months        What is lung cancer screening? Lung cancer screening is a way in which doctors check the lungs for early signs of cancer in people who have no symptoms of lung cancer. A low-dose CT scan uses much less radiation than a normal CT scan and shows a more detailed image of the lungs than a standard X-ray. The goal of lung cancer screening is to find cancer early, before it has a chance to grow, spread, or cause problems.   One large study found that smokers who were screened with low-dose CT scans were less likely to die of lung cancer than those who were screened with standard X-ray. Below is a summary of the things you need to know regarding screening for lung cancer with low-dose computed tomography (LDCT). This is a screening program that involves routine annual screening with LDCT studies of the lung. The LDCTs are done using low-dose radiation that is not thought to increase your cancer risk. If you have other serious medical conditions (other cancers, congestive heart failure) that limit your life expectancy to less than 10 years, you should not undergo lung cancer screening with LDCT. The chance is 20%-60% that the LDCT result will show abnormalities. This would require additional testing which could include repeat imaging or even invasive procedures. Most (about 95%) of \"abnormal\" LDCT results are false in the sense that no lung cancer is ultimately found. Additionally, some (about 10%) of the cancers found would not affect your life expectancy, even if undetected and untreated. If you are still smoking, the single most important thing that you can do to reduce your risk of dying of lung cancer is to quit. For this screening to be covered by Medicare and most other insurers, strict criteria must be met. If you do not meet these criteria, but still wish to undergo LDCT testing, you will be required to sign a waiver indicating your willingness to pay for the scan.                    Thank you for allowing me to assist in the care of the [place], MD Meghan Mike MD

## 2022-10-28 ENCOUNTER — TELEPHONE (OUTPATIENT)
Dept: PULMONOLOGY | Age: 77
End: 2022-10-28

## 2022-10-28 NOTE — TELEPHONE ENCOUNTER
Conversation: Upcoming surgery  (Oldest Message First)  October 21, 2022  Silvia Lied \"Skip\"  to [facility] [address] (supporting Mariangel Downey MD)    TD    2:50 PM  Dr Giacomo Raphael I am having a Vein ablation on 11-21 and they say I have to be cleared by you within 30 of the procedure. Since I just saw you on Monday it seems a little silly to me to have to do it again. Which office will you be working out if so I can get an appointment? Thanks Efrain Lara  October 26, 2022  Corinne Face Cherylitta Grief \"Skip\"  to [facility] [address] (supporting Mariangel Downey MD)    TD    2:18 PM  I sent a message on Oct 21 and have yet to get a reply. Would someone please respond for Dr Giacomo Raphael. This encounter is not signed. The conversation may still be ongoing.

## 2022-11-01 NOTE — TELEPHONE ENCOUNTER
With the patient about getting clearance so what ever paperwork or message you need to do patient is cleared for any type of surgery pulmonary sleep point of view

## 2022-11-01 NOTE — TELEPHONE ENCOUNTER
Faxed this telephone encounter and last office visit to Dr. Elizabeth Collado [facility] at [phone]. Pt also notified.

## 2022-12-25 DIAGNOSIS — G47.00 INSOMNIA, UNSPECIFIED TYPE: ICD-10-CM

## 2023-01-02 ENCOUNTER — PATIENT MESSAGE (OUTPATIENT)
Dept: PULMONOLOGY | Age: 78
End: 2023-01-02

## 2023-01-04 RX ORDER — CLONAZEPAM 1 MG/1
1 TABLET ORAL NIGHTLY PRN
Qty: 60 TABLET | Refills: 2 | Status: SHIPPED | OUTPATIENT
Start: 2023-01-04 | End: 2023-03-05

## 2023-01-10 ENCOUNTER — TELEPHONE (OUTPATIENT)
Dept: PULMONOLOGY | Age: 78
End: 2023-01-10

## 2023-01-10 NOTE — TELEPHONE ENCOUNTER
Patient is needing his cpap read remotely and it sent to his email 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 Please advise.  Thanks

## 2023-01-20 NOTE — TELEPHONE ENCOUNTER
From: Jaci Lara  To: Dr. Cecilia Hook: 1/2/2023 8:33 AM EST  Subject: Medication refill    Has the refill prescription been sent to the [facility] store in [facility]?

## 2023-01-24 ENCOUNTER — TELEPHONE (OUTPATIENT)
Dept: PULMONOLOGY | Age: 78
End: 2023-01-24

## 2023-01-24 NOTE — LETTER
[address] [facility] Critical Care and Sleep  [address] [place] [address]  Phone: [phone]  Fax: [phone]    Chavez Zaragoza MD        January 24, 2023     Patient: Capo Huggins   YOB: 1945   Date of Visit: 1/24/2023       To Whom It May Concern:    Patient is cleared for surgery, vein ablation from a pulmonary standpoint.         Chavez Zaragoza MD

## 2023-01-24 NOTE — TELEPHONE ENCOUNTER
Please send my clearance letter to the physician that needs it for his clearance for his vein ablation

## 2023-09-25 ENCOUNTER — TELEPHONE (OUTPATIENT)
Dept: PULMONOLOGY | Age: 78
End: 2023-09-25

## 2023-09-28 NOTE — TELEPHONE ENCOUNTER
Pt read [employer] message:  Last read by Pro Trent \"Skip\" at  2:18 PM on 9/28/2023.     And replied

## 2023-09-28 NOTE — TELEPHONE ENCOUNTER
IMPRESSION:   Stable small scattered pulmonary nodules.      ASSESSMENT: Lung RADS Category 2 - Benign appearance or behavior     MODIFIER:     RECOMMENDATION: Follow Up CT Screening Lung (Low-Dose CT - LDCT) in 1 Year       I have sent a message through epic to the patient directly through [address] [place] [address]

## 2023-10-09 ENCOUNTER — OFFICE VISIT (OUTPATIENT)
Dept: PULMONOLOGY | Age: 78
End: 2023-10-09
Payer: MEDICARE

## 2023-10-09 VITALS
HEART RATE: 90 BPM | DIASTOLIC BLOOD PRESSURE: 64 MMHG | TEMPERATURE: 97.3 F | HEIGHT: 78 IN | BODY MASS INDEX: 36.45 KG/M2 | OXYGEN SATURATION: 94 % | SYSTOLIC BLOOD PRESSURE: 116 MMHG | RESPIRATION RATE: 20 BRPM | WEIGHT: 315 LBS

## 2023-10-09 DIAGNOSIS — J44.9 CHRONIC OBSTRUCTIVE PULMONARY DISEASE, UNSPECIFIED COPD TYPE (HCC): Primary | ICD-10-CM

## 2023-10-09 DIAGNOSIS — G47.61 PLMD (PERIODIC LIMB MOVEMENT DISORDER): ICD-10-CM

## 2023-10-09 DIAGNOSIS — G47.00 INSOMNIA, UNSPECIFIED TYPE: ICD-10-CM

## 2023-10-09 DIAGNOSIS — G47.33 OSA (OBSTRUCTIVE SLEEP APNEA): ICD-10-CM

## 2023-10-09 DIAGNOSIS — G47.10 HYPERSOMNOLENCE: ICD-10-CM

## 2023-10-09 PROCEDURE — 99214 OFFICE O/P EST MOD 30 MIN: CPT | Performed by: INTERNAL MEDICINE

## 2023-10-09 PROCEDURE — 1123F ACP DISCUSS/DSCN MKR DOCD: CPT | Performed by: INTERNAL MEDICINE

## 2023-10-09 RX ORDER — CLONAZEPAM 1 MG/1
1 TABLET ORAL NIGHTLY
Qty: 30 TABLET | Refills: 1 | Status: SHIPPED | OUTPATIENT
Start: 2023-10-09 | End: 2023-11-08

## 2023-10-09 ASSESSMENT — ENCOUNTER SYMPTOMS
RESPIRATORY NEGATIVE: 1
EYES NEGATIVE: 1
GASTROINTESTINAL NEGATIVE: 1
ALLERGIC/IMMUNOLOGIC NEGATIVE: 1

## 2023-10-09 NOTE — PATIENT INSTRUCTIONS
Remember to bring a list of pulmonary medications and any CPAP or BiPAP machines to your next appointment with the office. Please keep all of your future appointments scheduled by [facility] Pul[facility] office. Out of respect for other patients and providers, you may be asked to reschedule your appointment if you arrive later than your scheduled appointment time. Appointments cancelled less than 24hrs in advance will be considered a no show. Patients with three missed appointments within 1 year or four missed appointments within 2 years can be dismissed from the practice. Please be aware that our physicians are required to work in the Intensive Care Unit at [facility].  Your appointment may need to be rescheduled if they are designated to work during your appointment time. You may receive a survey regarding the care you received during your visit. Your input is valuable to us. We encourage you to complete and return your survey. We hope you will choose us in the future for your healthcare needs. Pt instructed of all future appointment dates & times, including radiology, labs, procedures & referrals. If procedures were scheduled preparation instructions provided. Instructions on future appointments with [facility] were given.

## 2023-12-31 DIAGNOSIS — G47.61 PLMD (PERIODIC LIMB MOVEMENT DISORDER): ICD-10-CM

## 2023-12-31 DIAGNOSIS — G47.00 INSOMNIA, UNSPECIFIED TYPE: ICD-10-CM

## 2024-01-02 NOTE — TELEPHONE ENCOUNTER
Last office visit 10/9/2023     Last written 10/9/23     Next office visit scheduled 4/1/2024    Requested Prescriptions     Pending Prescriptions Disp Refills    clonazePAM (KLONOPIN) 1 MG tablet 30 tablet 1     Sig: Take 1 tablet by mouth at bedtime for 30 days. Max Daily Amount: 1 mg

## 2024-01-03 RX ORDER — CLONAZEPAM 1 MG/1
1 TABLET ORAL NIGHTLY
Qty: 30 TABLET | Refills: 2 | Status: SHIPPED | OUTPATIENT
Start: 2024-01-03 | End: 2024-02-02

## 2024-04-01 ENCOUNTER — OFFICE VISIT (OUTPATIENT)
Dept: PULMONOLOGY | Age: 79
End: 2024-04-01
Payer: MEDICARE

## 2024-04-01 VITALS
HEART RATE: 70 BPM | HEIGHT: 78 IN | OXYGEN SATURATION: 95 % | SYSTOLIC BLOOD PRESSURE: 123 MMHG | DIASTOLIC BLOOD PRESSURE: 73 MMHG | WEIGHT: 315 LBS | RESPIRATION RATE: 16 BRPM | TEMPERATURE: 97.5 F | BODY MASS INDEX: 36.45 KG/M2

## 2024-04-01 DIAGNOSIS — G47.61 PLMD (PERIODIC LIMB MOVEMENT DISORDER): ICD-10-CM

## 2024-04-01 DIAGNOSIS — G47.33 OSA (OBSTRUCTIVE SLEEP APNEA): Primary | ICD-10-CM

## 2024-04-01 DIAGNOSIS — G47.00 INSOMNIA, UNSPECIFIED TYPE: ICD-10-CM

## 2024-04-01 DIAGNOSIS — E66.2 CLASS 2 OBESITY WITH ALVEOLAR HYPOVENTILATION WITHOUT SERIOUS COMORBIDITY WITH BODY MASS INDEX (BMI) OF 38.0 TO 38.9 IN ADULT (HCC): ICD-10-CM

## 2024-04-01 DIAGNOSIS — Z87.891 PERSONAL HISTORY OF TOBACCO USE: ICD-10-CM

## 2024-04-01 DIAGNOSIS — R06.02 SOB (SHORTNESS OF BREATH): ICD-10-CM

## 2024-04-01 DIAGNOSIS — J44.9 CHRONIC OBSTRUCTIVE PULMONARY DISEASE, UNSPECIFIED COPD TYPE (HCC): ICD-10-CM

## 2024-04-01 PROCEDURE — 1123F ACP DISCUSS/DSCN MKR DOCD: CPT | Performed by: INTERNAL MEDICINE

## 2024-04-01 PROCEDURE — 99214 OFFICE O/P EST MOD 30 MIN: CPT | Performed by: INTERNAL MEDICINE

## 2024-04-01 RX ORDER — ALBUTEROL SULFATE AND BUDESONIDE 90; 80 UG/1; UG/1
2 AEROSOL, METERED RESPIRATORY (INHALATION)
Qty: 1 G | Refills: 4 | Status: SHIPPED | OUTPATIENT
Start: 2024-04-01

## 2024-04-01 RX ORDER — CLONAZEPAM 1 MG/1
1 TABLET ORAL NIGHTLY
Qty: 30 TABLET | Refills: 3 | Status: SHIPPED | OUTPATIENT
Start: 2024-04-01 | End: 2024-05-01

## 2024-04-01 ASSESSMENT — SLEEP AND FATIGUE QUESTIONNAIRES
HOW LIKELY ARE YOU TO NOD OFF OR FALL ASLEEP IN A CAR, WHILE STOPPED FOR A FEW MINUTES IN TRAFFIC: WOULD NEVER DOZE
ESS TOTAL SCORE: 5
HOW LIKELY ARE YOU TO NOD OFF OR FALL ASLEEP WHILE SITTING INACTIVE IN A PUBLIC PLACE: WOULD NEVER DOZE
HOW LIKELY ARE YOU TO NOD OFF OR FALL ASLEEP WHILE LYING DOWN TO REST IN THE AFTERNOON WHEN CIRCUMSTANCES PERMIT: MODERATE CHANCE OF DOZING
HOW LIKELY ARE YOU TO NOD OFF OR FALL ASLEEP WHEN YOU ARE A PASSENGER IN A CAR FOR AN HOUR WITHOUT A BREAK: SLIGHT CHANCE OF DOZING
HOW LIKELY ARE YOU TO NOD OFF OR FALL ASLEEP WHILE SITTING AND TALKING TO SOMEONE: WOULD NEVER DOZE
HOW LIKELY ARE YOU TO NOD OFF OR FALL ASLEEP WHILE SITTING QUIETLY AFTER LUNCH WITHOUT ALCOHOL: WOULD NEVER DOZE
HOW LIKELY ARE YOU TO NOD OFF OR FALL ASLEEP WHILE SITTING AND READING: SLIGHT CHANCE OF DOZING
HOW LIKELY ARE YOU TO NOD OFF OR FALL ASLEEP WHILE WATCHING TV: SLIGHT CHANCE OF DOZING

## 2024-04-01 ASSESSMENT — ENCOUNTER SYMPTOMS
GASTROINTESTINAL NEGATIVE: 1
EYES NEGATIVE: 1
RESPIRATORY NEGATIVE: 1

## 2024-04-01 NOTE — PROGRESS NOTES
MA Communication:  The following orders are received by verbal communication from Jose Francisco Mack MD    Orders include:    Patient is going to follow Dr. Mack or possibly Justine     
  Cardiovascular:      Rate and Rhythm: Normal rate and regular rhythm.      Pulses: Normal pulses.      Heart sounds: Normal heart sounds. No murmur heard.     No friction rub.   Pulmonary:      Effort: No respiratory distress.      Breath sounds: No stridor. No wheezing, rhonchi or rales.   Chest:      Chest wall: No tenderness.   Abdominal:      General: Abdomen is flat. Bowel sounds are normal. There is no distension.      Tenderness: There is no abdominal tenderness. There is no guarding.   Musculoskeletal:         General: No swelling or tenderness. Normal range of motion.      Cervical back: Normal range of motion and neck supple. No rigidity.   Skin:     General: Skin is warm and dry.      Coloration: Skin is not jaundiced.   Neurological:      General: No focal deficit present.      Mental Status: He is alert and oriented to person, place, and time. Mental status is at baseline.      Cranial Nerves: No cranial nerve deficit.      Sensory: No sensory deficit.      Motor: No weakness.      Gait: Gait normal.   Psychiatric:         Mood and Affect: Mood normal.         Thought Content: Thought content normal.         Judgment: Judgment normal.              EVAN INIGUEZ MD Low Dose CT (LDCT) Lung Screening criteria met:     Age 50-77(Medicare) or 50-80 (USPSTF)   Pack year smoking >20   Still smoking or less than 15 year since quit   No sign or symptoms of lung cancer   > 11 months since last LDCT     Risks and benefits of lung cancer screening with LDCT scans discussed:    Significance of positive screen - False-positive LDCT results often occur. 95% of all positive results do not lead to a diagnosis of cancer. Usually further imaging can resolve most false-positive results; however, some patients may require invasive procedures.    Over diagnosis risk - 10% to 12% of screen-detected lung cancer cases are over diagnosed--that is, the cancer would not have been detected in the patient's lifetime without the

## 2024-04-01 NOTE — PATIENT INSTRUCTIONS
ASSESSMENT/PLAN:   Diagnosis Orders   1. MARYAM (obstructive sleep apnea)        2. Chronic obstructive pulmonary disease, unspecified COPD type ([facility])        3. PLMD (periodic limb movement disorder)        4. Insomnia, unspecified type        5. Class 2 obesity with alveolar hypoventilation without serious comorbidity with body mass index (BMI) of 38.0 to 38.9 in adult ([facility])        6. Personal history of tobacco use        7. SOB (shortness of breath)            cxr 1/2018  Mild bibasilar atelectasis without definite acute airspace consolidation  or pleural effusion.    PFT done 9/2017  Spirometry shows moderate obstructive defect.  There is significant response to bronchodilator demonstrated.  The flow volume loop is compatible with obstructive lung defect.  There is air trapping present.  DLCO is moderately reduced but corrects partially when adjusted to [facility].    2/4/20  LDCT done at [facility]    IMPRESSION:    1. There are several stable nodules identified within the left lung measuring 5 mm and less in size.  2. Coronary artery calcification.  3. Stable rounded atelectasis and scarring within the right lower lobe.    ASSESSMENT: Lung RADS Category 2 - Benign appearance or behavior    MODIFIER: None    RECOMMENDATION: Follow Up CT Screening Lung (Low-Dose CT - LDCT) in 1 Year      Low-dose CT scan done 11/14/2021 at [facility]  IMPRESSION:   Stable scattered small pulmonary nodules measuring up to 5 mm.     ASSESSMENT: Lung RADS Category 2 - Benign appearance or behavior     MODIFIER: None     RECOMMENDATION: Follow Up CT Screening Lung (Low-Dose CT - LDCT) in 1 Year      Tobacco use , history  Quit smoking  March 3, 2012  30 pky    LDCT done 9/19/23    Impression    IMPRESSION:   Stable small scattered pulmonary nodules.     ASSESSMENT: Lung RADS Category 2 - Benign appearance or behavior     MODIFIER:     RECOMMENDATION: Follow Up CT Screening Lung (Low-Dose CT - LDCT) in 1 Year         Will plan on doing LDCT

## 2024-06-24 ENCOUNTER — TELEPHONE (OUTPATIENT)
Dept: SURGERY | Age: 79
End: 2024-06-24

## 2024-06-24 ENCOUNTER — HOSPITAL ENCOUNTER (OUTPATIENT)
Age: 79
Setting detail: OUTPATIENT SURGERY
Discharge: HOME | End: 2024-06-24
Attending: INTERNAL MEDICINE | Admitting: INTERNAL MEDICINE
Payer: MEDICARE

## 2024-06-24 ENCOUNTER — ANESTHESIA (OUTPATIENT)
Dept: ENDOSCOPY | Age: 79
End: 2024-06-24
Payer: MEDICARE

## 2024-06-24 ENCOUNTER — ANESTHESIA EVENT (OUTPATIENT)
Dept: ENDOSCOPY | Age: 79
End: 2024-06-24
Payer: MEDICARE

## 2024-06-24 VITALS
RESPIRATION RATE: 16 BRPM | BODY MASS INDEX: 36.45 KG/M2 | DIASTOLIC BLOOD PRESSURE: 70 MMHG | HEART RATE: 69 BPM | WEIGHT: 315 LBS | HEIGHT: 78 IN | OXYGEN SATURATION: 99 % | TEMPERATURE: 97.1 F | SYSTOLIC BLOOD PRESSURE: 119 MMHG

## 2024-06-24 DIAGNOSIS — R19.7 DIARRHEA, UNSPECIFIED TYPE: ICD-10-CM

## 2024-06-24 DIAGNOSIS — K92.1 BLACK STOOL: ICD-10-CM

## 2024-06-24 DIAGNOSIS — Z86.010 HX OF ADENOMATOUS POLYP OF COLON: ICD-10-CM

## 2024-06-24 PROCEDURE — 2580000003 HC RX 258: Performed by: ANESTHESIOLOGY

## 2024-06-24 PROCEDURE — 3700000000 HC ANESTHESIA ATTENDED CARE: Performed by: INTERNAL MEDICINE

## 2024-06-24 PROCEDURE — 2720000010 HC SURG SUPPLY STERILE: Performed by: INTERNAL MEDICINE

## 2024-06-24 PROCEDURE — 7100000011 HC PHASE II RECOVERY - ADDTL 15 MIN: Performed by: INTERNAL MEDICINE

## 2024-06-24 PROCEDURE — 2580000003 HC RX 258

## 2024-06-24 PROCEDURE — 88305 TISSUE EXAM BY PATHOLOGIST: CPT

## 2024-06-24 PROCEDURE — 6360000002 HC RX W HCPCS

## 2024-06-24 PROCEDURE — 2500000003 HC RX 250 WO HCPCS

## 2024-06-24 PROCEDURE — 3609012400 HC EGD TRANSORAL BIOPSY SINGLE/MULTIPLE: Performed by: INTERNAL MEDICINE

## 2024-06-24 PROCEDURE — 2709999900 HC NON-CHARGEABLE SUPPLY: Performed by: INTERNAL MEDICINE

## 2024-06-24 PROCEDURE — 7100000010 HC PHASE II RECOVERY - FIRST 15 MIN: Performed by: INTERNAL MEDICINE

## 2024-06-24 PROCEDURE — 3700000001 HC ADD 15 MINUTES (ANESTHESIA): Performed by: INTERNAL MEDICINE

## 2024-06-24 PROCEDURE — 3609010600 HC COLONOSCOPY POLYPECTOMY SNARE/COLD BIOPSY: Performed by: INTERNAL MEDICINE

## 2024-06-24 RX ORDER — SODIUM CHLORIDE, SODIUM LACTATE, POTASSIUM CHLORIDE, CALCIUM CHLORIDE 600; 310; 30; 20 MG/100ML; MG/100ML; MG/100ML; MG/100ML
INJECTION, SOLUTION INTRAVENOUS CONTINUOUS
Status: DISCONTINUED | OUTPATIENT
Start: 2024-06-24 | End: 2024-06-24 | Stop reason: HOSPADM

## 2024-06-24 RX ORDER — SODIUM CHLORIDE, SODIUM LACTATE, POTASSIUM CHLORIDE, CALCIUM CHLORIDE 600; 310; 30; 20 MG/100ML; MG/100ML; MG/100ML; MG/100ML
INJECTION, SOLUTION INTRAVENOUS CONTINUOUS PRN
Status: DISCONTINUED | OUTPATIENT
Start: 2024-06-24 | End: 2024-06-24 | Stop reason: SDUPTHER

## 2024-06-24 RX ORDER — PROPOFOL 10 MG/ML
INJECTION, EMULSION INTRAVENOUS PRN
Status: DISCONTINUED | OUTPATIENT
Start: 2024-06-24 | End: 2024-06-24 | Stop reason: SDUPTHER

## 2024-06-24 RX ORDER — PROPOFOL 10 MG/ML
INJECTION, EMULSION INTRAVENOUS CONTINUOUS PRN
Status: DISCONTINUED | OUTPATIENT
Start: 2024-06-24 | End: 2024-06-24 | Stop reason: SDUPTHER

## 2024-06-24 RX ORDER — PANTOPRAZOLE SODIUM 40 MG/1
40 TABLET, DELAYED RELEASE ORAL
Qty: 90 TABLET | Refills: 2 | Status: SHIPPED | OUTPATIENT
Start: 2024-06-24

## 2024-06-24 RX ORDER — LIDOCAINE HYDROCHLORIDE 20 MG/ML
INJECTION, SOLUTION INFILTRATION; PERINEURAL PRN
Status: DISCONTINUED | OUTPATIENT
Start: 2024-06-24 | End: 2024-06-24 | Stop reason: SDUPTHER

## 2024-06-24 RX ADMIN — PROPOFOL 150 MCG/KG/MIN: 10 INJECTION, EMULSION INTRAVENOUS at 08:52

## 2024-06-24 RX ADMIN — LIDOCAINE HYDROCHLORIDE 100 MG: 20 INJECTION, SOLUTION INFILTRATION; PERINEURAL at 08:52

## 2024-06-24 RX ADMIN — SODIUM CHLORIDE, SODIUM LACTATE, POTASSIUM CHLORIDE, AND CALCIUM CHLORIDE: .6; .31; .03; .02 INJECTION, SOLUTION INTRAVENOUS at 08:45

## 2024-06-24 RX ADMIN — SODIUM CHLORIDE, POTASSIUM CHLORIDE, SODIUM LACTATE AND CALCIUM CHLORIDE: 600; 310; 30; 20 INJECTION, SOLUTION INTRAVENOUS at 08:18

## 2024-06-24 RX ADMIN — PROPOFOL 100 MG: 10 INJECTION, EMULSION INTRAVENOUS at 08:52

## 2024-06-24 ASSESSMENT — PAIN SCALES - GENERAL: PAINLEVEL_OUTOF10: 0

## 2024-06-24 NOTE — ANESTHESIA POSTPROCEDURE EVALUATION
Department of Anesthesiology  Postprocedure Note    Patient: Ko Lara  MRN: [id number]  YOB: 1945  Date of evaluation: 6/24/2024    Procedure Summary       Date: 06/24/24 Room / Location: [place] [address] / [facility]    Anesthesia Start: 0845 Anesthesia Stop: 0939    Procedures:       ESOPHAGOGASTRODUODENOSCOPY BIOPSY      COLONOSCOPY POLYPECTOMY SNARE/BIOPSY / BX Diagnosis:       Hx of adenomatous polyp of colon      Diarrhea, unspecified type      Black stool      (Hx of adenomatous polyp of colon [Z86.010])      (Diarrhea, unspecified type [R19.7])      (Black stool [K92.1])    Surgeons: Oscar Melgar MD Responsible Provider: Julio Cesar Vang DO    Anesthesia Type: MAC ASA Status: 3            Anesthesia Type: MAC    Dilip Phase I: Dilip Score: 10    Dilip Phase II:      Vitals:    06/24/24 0943   BP: (P) 104/63   Pulse: (P) 64   Resp:    Temp: (P) 97.1 °F (36.2 °C)   SpO2: (P) 92%       Anesthesia Post Evaluation    Patient location during evaluation: PACU  Patient participation: complete - patient participated  Level of consciousness: awake and awake and alert  Pain score: 0  Airway patency: patent  Nausea & Vomiting: no nausea and no vomiting  Cardiovascular status: hemodynamically stable  Respiratory status: acceptable  Hydration status: euvolemic  Pain management: adequate and satisfactory to patient        No notable events documented.

## 2024-06-24 NOTE — PROCEDURES
Endoscopy Note    Patient: Ko Lara  : 1945  CSN: [id number]    Procedure: Esophagogastroduodenoscopy with biopsy    Date:  2024     Surgeon:  Oscar Melgar MD     Referring Physician:  Mark Anthony Ayala MD    Preoperative Diagnosis:  Hx of adenomatous polyp of colon [Z86.010]  Diarrhea, unspecified type [R19.7]  Black stool [K92.1]    Postoperative Diagnosis: #1 moderate to severe duodenitis biopsied #2 gastritis biopsied #3 small hiatal    Anesthesia:  MAC    EBL: minimal to none.    Indications: This is a 78 y.o. year old male who comes in today because he has been having a couple GI problems mainly from the upper GI system he has been having black tarry stools he is also been having an upset stomach redoing an upper endoscopy today to evaluate    Description of Procedure:  Informed consent was obtained from the patient after explanation of indications, benefits and possible risks and complications of the procedure.  The patient was then taken to the endoscopy suite, placed in the left lateral decubitus position and the above IV sedation was administrered.    The Olympus video endoscope was passed through the hypopharynx     Posterior pharynx was normal    Esophagus inlet patch in the upper esophagus otherwise normal esophagus    Hiatal hernia 1 cm    Stomach was inflamed mild to moderate we did biopsy for H. pylori but there were no ulcers    Duodenum was inflamed moderate to severe there is lots of bulbar duodenitis with small shallow ulcers this actually lasted all the way to the second portion of the duodenum we did take several biopsies    Retroflexion did show the small hiatal hernia      Gastric or Duodenal ulcer present: Yes    The patient tolerated the procedure well and was taken to the post anesthesia care unit in good condition.  There were no immediate complications.      Impression: Hiatal hernia  Gastritis  Duodenitis      Recommendations: Will need to await the biopsy

## 2024-06-24 NOTE — PROCEDURES
Colonoscopy Procedure  Note          Patient: Ko Lara  : 1945  CRN:  @CRN@    Procedure: Colonoscopy with biopsy, polypectomy (cold snare), polypectomy (cold biopsy)    Date:  2024    Surgeon:  Oscar Melgar MD, MD    Referring Physician:  Mark Anthony Ayala MD    Preoperative Diagnosis:  Hx of adenomatous polyp of colon [Z86.010]  Diarrhea, unspecified type [R19.7]  Black stool [K92.1]    Postoperative Diagnosis: #1 patient has inflammation throughout the colon #2 patient has pandiverticulosis #3 patient has large internal and external hemorrhoids #4 rectal polyp removed #5 2 sigmoid polyps removed #6 1 transverse colon polyp removed #7-2 ascending colon polyps removed    Anesthesia:  MAC    EBL: Minimal to none.    Indications: This is a 78 y.o. year old male who comes in today because been having some frequent stools he has been having some melena and he has a history of having adenomatous colon polyp    Procedure:   An informed consent was obtained from the patient after explanation of indications, benefits, possible risks and complications of the procedure.  The patient was then taken to the endoscopy suite, placed in the left lateral decubitus position, and the above IV anesthesia was administered.      Digital rectal examination was performed.  Large external hemorrhoids but no other masses      Rectum there was a 9 mm polyp that removed with cold snare polypectomy and also some inflammation retroflexion showed large hemorrhoid    Sigmoid extensive diverticulosis we found 2 polyps that were 6 mm removed with biopsy forceps    Descending there were inflammation and diverticulosis    Transverse we found a 1 cm polyp here that removed with cold snare polypectomy it bled so we placed an Endo Clip  Diverticulosis and inflammation we did biopsy the inflammation    Ascending we found two 6 mm polyps here that removed with biopsy forceps these were actually inflamed there is also inflammation

## 2024-06-24 NOTE — TELEPHONE ENCOUNTER
Offered patient an appointment, he declined stating he wanted more information on the doctor. Emailed patient doctor name and contact number so he can reach out when ready.

## 2024-06-24 NOTE — DISCHARGE INSTRUCTIONS
ENDOSCOPY & COLONOSCOPY DISCHARGE INSTRUCTIONS:    Call the physician that did your procedure for any questions or concern:    [facility]: [phone]  DR. BRITNI VIERA       ACTIVITY:    There are potential side effects to the medications used for sedation and anesthesia during your procedure.  These include:  Dizziness or light-headedness, confusion or memory loss, delayed reaction times, loss of coordination, nausea and vomiting.  Because of your increased risk for injury, we ask that you observe the following precautions:  For the next 24 hours,  DO NOT operate an automobile, bicycle, motorcycle, , power tools or large equipment of any kind.  Do not drink alcohol, sign any legal documents or make any legal decisions for 24 hours.  Do not bend your head over lower than your heart.  DO sit on the side of bed/couch awhile before getting up.  Plan on bedrest or quiet relaxation today.  You may resume normal activities in 24 hours.    DIET:    Your first meal today should be light, avoiding spicy and fatty foods.  If you tolerate this first meal, then you may advance to your regular diet unless otherwise advised by your physician.    NORMAL SYMPTOMS:  -Mild sore throat if you’ve had an EGD   -Gaseous discomfort    NOTIFY YOUR PHYSICIAN IF THESE SYMPTOMS OCCUR:  1. Fever (greater than 100)  5. Increased abdominal bloating  2. Severe pain    6. Excessive bleeding  3. Nausea and vomiting  7. Chest pain                                                                    4. Chills    8. Shortness of breath    ADDITIONAL INSTRUCTIONS:      Recommendations: At this time there are number of reasons for this patient to have either anemia or blood loss the acute inflammation in the diverticulosis to hemorrhoids and the polyp     Will need to wait for the biopsy results to come back     Patient can go on his Eliquis tomorrow     Definitely a follow-up colonoscopy in 2 years given the prep which was not very good and

## 2024-06-24 NOTE — H&P
Gastroenterology Note             Pre-operative History and Physical    Patient: Ko Lara  : 1945  CSN:     History Obtained From:  patient and/or guardian.     HISTORY OF PRESENT ILLNESS:    The patient is a 78 y.o. male  here for EGD/colonoscopy.   Is a very pleasant 78-year-old male who was recently seen in the office by our certified physicians assistant had been having problems with loose frequent stools black tarry stools and and queasy or nauseous feeling the patient had been on some Pepto-Bismol been taken off that been on some Eliquis so doing an upper and lower endoscopy this morning    Past Medical History:    Past Medical History:   Diagnosis Date    Arthritis     CAD (coronary artery disease)     CHF (congestive heart failure) (HCC)     COPD (chronic obstructive pulmonary disease) (HCC)     Hyperlipidemia     Hypertension     Sleep apnea      Past Surgical History:    Past Surgical History:   Procedure Laterality Date    JOINT REPLACEMENT       Medications Prior to Admission:   No current facility-administered medications on file prior to encounter.     Current Outpatient Medications on File Prior to Encounter   Medication Sig Dispense Refill    clonazePAM (KLONOPIN) 1 MG tablet Take 1 tablet by mouth at bedtime for 30 days. Max Daily Amount: 1 mg 30 tablet 3    Albuterol-Budesonide (AIRSUPRA) 90-80 MCG/ACT AERO Inhale 2 puffs into the lungs every 4 hours 1 g 4    clonazePAM (KLONOPIN) 1 MG tablet Take 1 tablet by mouth at bedtime for 30 days. Max Daily Amount: 1 mg 30 tablet 2    clonazePAM (KLONOPIN) 1 MG tablet Take 1 tablet by mouth nightly as needed (60) for up to 60 days. 60 tablet 2    cilostazol (PLETAL) 100 MG tablet Take 1 tablet by mouth 2 times daily      atorvastatin (LIPITOR) 80 MG tablet Take 1 tablet by mouth daily      vitamin C (ASCORBIC ACID) 500 MG tablet Take 1 tablet by mouth daily      gabapentin (NEURONTIN) 300 MG capsule Take 1 capsule by mouth daily.

## 2024-06-24 NOTE — PROGRESS NOTES
Ambulatory Surgery/Procedure Discharge Note    Vitals:    06/24/24 1010   BP: 119/70   Pulse: 69   Resp: 16   Temp:    SpO2: 99%       In: 900 [I.V.:900]  Out: -     Restroom use offered before discharge.  Yes    Pain assessment:  none  Pain Level: 0    Patient has been seen by doctor. Discharge order obtained, and discharge instructions reviewed. Patient educated, using the teach back method, about follow up instructions and discharge instructions. A completed copy of the AVS instructions given to patient and all questions answered. IV catheter removed without complaints, catheter intact, site WNL.       Patient discharged to home/self care. Patient discharged via wheel chair by transporter to waiting family/S.O.       6/24/2024 10:17 AM

## 2024-06-24 NOTE — TELEPHONE ENCOUNTER
----- Message from Christian Arboleda MD sent at 6/24/2024 11:25 AM EDT -----  Referral from Dr. Melgar for hemorrhoids. Can we call and offer appointment,    Thanks    Dr. Arboleda

## 2024-06-24 NOTE — ANESTHESIA PRE PROCEDURE
Department of Anesthesiology  Preprocedure Note       Name:  Ko Lara   Age:  78 y.o.  :  1945                                          MRN:  [id number]         Date:  2024      Surgeon: Surgeon(s):  Oscar Melgar MD    Procedure: Procedure(s):  COLONOSCOPY  ESOPHAGOGASTRODUODENOSCOPY    Medications prior to admission:   Prior to Admission medications    Medication Sig Start Date End Date Taking? Authorizing Provider   clonazePAM (KLONOPIN) 1 MG tablet Take 1 tablet by mouth at bedtime for 30 days. Max Daily Amount: 1 mg 24  Jose Francisco Mack MD   Albuterol-Budesonide (AIRSUPRA) 90-80 MCG/ACT AERO Inhale 2 puffs into the lungs every 4 hours 24   Jose Francisco Mack MD   clonazePAM (KLONOPIN) 1 MG tablet Take 1 tablet by mouth at bedtime for 30 days. Max Daily Amount: 1 mg 1/3/24 2/2/24  Jose Francisco Mack MD   clonazePAM (KLONOPIN) 1 MG tablet Take 1 tablet by mouth nightly as needed (60) for up to 60 days. 23  Jose Francisco Mack MD   cilostazol (PLETAL) 100 MG tablet Take 1 tablet by mouth 2 times daily 22   Sterling Bueno MD   atorvastatin (LIPITOR) 80 MG tablet Take 1 tablet by mouth daily    Sterilng Bueno MD   vitamin C (ASCORBIC ACID) 500 MG tablet Take 1 tablet by mouth daily    Sterling Bueno MD   gabapentin (NEURONTIN) 300 MG capsule Take 1 capsule by mouth daily.    Sterling Bueno MD   ELIQUIS 5 MG TABS tablet 1 tablet 2 times daily 22   Sterling Bueno MD   vitamin D (CHOLECALCIFEROL) 25 MCG (1000 UT) TABS tablet Take 2 tablets by mouth daily    Sterling Bueno MD   ezetimibe (ZETIA) 10 MG tablet Take 1 tablet by mouth daily 3/31/21   Sterling Bueno MD   furosemide (LASIX) 40 MG tablet TAKE 1 TABLET BY MOUTH DAILY 10/21/21   Sterling Bueno MD   potassium chloride (MICRO-K) 10 MEQ extended release capsule 1 capsule by Nasogastric route 2 times daily    Sterling Bueno MD

## (undated) DEVICE — CLIPPING DEVICE: Brand: RESOLUTION CLIP

## (undated) DEVICE — FORCEPS BX L240CM JAW DIA2.4MM ORNG L CAP W/ NDL DISP RAD

## (undated) DEVICE — TRAP SPEC RETRV CLR PLAS POLYP IN LN SUCT QUIK CTCH

## (undated) DEVICE — CANNULA SAMP CO2 AD GRN 7FT CO2 AND 7FT O2 TBNG UNIV CONN

## (undated) DEVICE — SNARE COLD DIAMOND 10MM THIN